# Patient Record
Sex: FEMALE | Race: WHITE
[De-identification: names, ages, dates, MRNs, and addresses within clinical notes are randomized per-mention and may not be internally consistent; named-entity substitution may affect disease eponyms.]

---

## 2018-02-09 ENCOUNTER — HOSPITAL ENCOUNTER (OUTPATIENT)
Dept: HOSPITAL 89 - LAB | Age: 23
End: 2018-02-09
Attending: SURGERY
Payer: COMMERCIAL

## 2018-02-09 VITALS — BODY MASS INDEX: 42.13 KG/M2

## 2018-02-09 DIAGNOSIS — L98.0: Primary | ICD-10-CM

## 2018-02-09 PROCEDURE — 88305 TISSUE EXAM BY PATHOLOGIST: CPT

## 2019-04-12 ENCOUNTER — HOSPITAL ENCOUNTER (EMERGENCY)
Dept: HOSPITAL 89 - ER | Age: 24
Discharge: HOME | End: 2019-04-12
Payer: COMMERCIAL

## 2019-04-12 VITALS — BODY MASS INDEX: 42.13 KG/M2 | WEIGHT: 235 LBS

## 2019-04-12 VITALS — SYSTOLIC BLOOD PRESSURE: 133 MMHG | DIASTOLIC BLOOD PRESSURE: 89 MMHG

## 2019-04-12 DIAGNOSIS — R11.0: ICD-10-CM
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
## 2019-04-12 DIAGNOSIS — S09.90XA: ICD-10-CM

## 2019-04-12 DIAGNOSIS — S00.03XA: Primary | ICD-10-CM
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
## 2019-04-12 PROCEDURE — 99283 EMERGENCY DEPT VISIT LOW MDM: CPT
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
## 2019-07-12 ENCOUNTER — HOSPITAL ENCOUNTER (EMERGENCY)
Dept: HOSPITAL 89 - ER | Age: 24
Discharge: HOME | End: 2019-07-12
Payer: SELF-PAY

## 2019-07-12 VITALS — SYSTOLIC BLOOD PRESSURE: 117 MMHG | DIASTOLIC BLOOD PRESSURE: 74 MMHG

## 2019-07-12 VITALS — WEIGHT: 235 LBS | BODY MASS INDEX: 42.13 KG/M2

## 2019-07-12 DIAGNOSIS — R09.1: ICD-10-CM

## 2019-07-12 DIAGNOSIS — B34.9: Primary | ICD-10-CM

## 2019-07-12 LAB — PLATELET COUNT, AUTOMATED: 325 K/UL (ref 150–450)

## 2019-07-12 PROCEDURE — 84295 ASSAY OF SERUM SODIUM: CPT

## 2019-07-12 PROCEDURE — 96361 HYDRATE IV INFUSION ADD-ON: CPT

## 2019-07-12 PROCEDURE — 85379 FIBRIN DEGRADATION QUANT: CPT

## 2019-07-12 PROCEDURE — 82435 ASSAY OF BLOOD CHLORIDE: CPT

## 2019-07-12 PROCEDURE — 82040 ASSAY OF SERUM ALBUMIN: CPT

## 2019-07-12 PROCEDURE — 84703 CHORIONIC GONADOTROPIN ASSAY: CPT

## 2019-07-12 PROCEDURE — 82247 BILIRUBIN TOTAL: CPT

## 2019-07-12 PROCEDURE — 82310 ASSAY OF CALCIUM: CPT

## 2019-07-12 PROCEDURE — 84484 ASSAY OF TROPONIN QUANT: CPT

## 2019-07-12 PROCEDURE — 84460 ALANINE AMINO (ALT) (SGPT): CPT

## 2019-07-12 PROCEDURE — 82565 ASSAY OF CREATININE: CPT

## 2019-07-12 PROCEDURE — 82374 ASSAY BLOOD CARBON DIOXIDE: CPT

## 2019-07-12 PROCEDURE — 84520 ASSAY OF UREA NITROGEN: CPT

## 2019-07-12 PROCEDURE — 84075 ASSAY ALKALINE PHOSPHATASE: CPT

## 2019-07-12 PROCEDURE — 84132 ASSAY OF SERUM POTASSIUM: CPT

## 2019-07-12 PROCEDURE — 84155 ASSAY OF PROTEIN SERUM: CPT

## 2019-07-12 PROCEDURE — 99284 EMERGENCY DEPT VISIT MOD MDM: CPT

## 2019-07-12 PROCEDURE — 96375 TX/PRO/DX INJ NEW DRUG ADDON: CPT

## 2019-07-12 PROCEDURE — 93005 ELECTROCARDIOGRAM TRACING: CPT

## 2019-07-12 PROCEDURE — 85025 COMPLETE CBC W/AUTO DIFF WBC: CPT

## 2019-07-12 PROCEDURE — 82947 ASSAY GLUCOSE BLOOD QUANT: CPT

## 2019-07-12 PROCEDURE — 96374 THER/PROPH/DIAG INJ IV PUSH: CPT

## 2019-07-12 PROCEDURE — 71045 X-RAY EXAM CHEST 1 VIEW: CPT

## 2019-07-12 PROCEDURE — 83880 ASSAY OF NATRIURETIC PEPTIDE: CPT

## 2019-07-12 PROCEDURE — 84450 TRANSFERASE (AST) (SGOT): CPT

## 2019-07-12 NOTE — RADIOLOGY IMAGING REPORT
FACILITY: [facility] 

 

PATIENT NAME: Long Hui

: 1995

MR: [id number]

V: [id number]

EXAM DATE: 

ORDERING PHYSICIAN: DARWIN RICHARDSON

TECHNOLOGIST: 

 

Location: [facility] [place]

Patient: Long Hui

: 1995

MRN: [id number]

Visit/Account:[id number]

Date of Sevice:  2019

 

ACCESSION #: [id number]

 

EXAMINATION: Portable AP Chest

 

HISTORY: Chest pain.

 

COMPARISON: None.

 

FINDINGS:

The lungs are clear.  No focal consolidation or pleural effusion. No pneumothorax.  Normal cardiomedi
astinal silhouette, with normal heart size and pulmonary vascularity.  Visualized osseous structures 
are unremarkable.

 

IMPRESSION:

No evidence of acute cardiopulmonary disease.

 

Report Dictated By: Blayne Ely MD at 2019 8:34 PM

 

Report E-Signed By: Blayne Ely MD  at 2019 8:35 PM

 

WSN:[id number]

## 2019-07-12 NOTE — EKG
FACILITY: [facility] [place] 

 

PATIENT NAME: MIHIR LUND

: [id number]

MR: [id number]

V: [id number]

EXAM DATE: 

ORDERING PHYSICIAN: DARWIN RICHARDSON

TECHNOLOGIST: BEATRICE

 

Test Reason : CP, SOB

Blood Pressure : ***/*** mmHG

Vent. Rate : 084 BPM     Atrial Rate : 084 BPM

   P-R Int : 164 ms          QRS Dur : 098 ms

    QT Int : 386 ms       P-R-T Axes : 001 103 027 degrees

   QTc Int : 456 ms

 

Normal sinus rhythm

Nonspecific T wave abnormality in V 1-4 consistent with age.

Right axis deviation.

Borderline QTc.

No previous ECGs available

Confirmed by SINGH DENISE ([id number]) on 2019 10:58:19 PM

 

Referred By:             Confirmed By:SINGH DENISE

## 2019-07-12 NOTE — ER REPORT
History and Physical


Time Seen By MD:  19:54


HPI/ROS


CHIEF COMPLAINT: chest pain, diarrhea, vomiting





HISTORY OF PRESENT ILLNESS: This is a 23 year old female. She has been feeling 


sick today. Has had diarrhea and vomiting since earlier today. Unable to keep 


things down. Started having chest pain, bilaterally, lower ribs, worse with 


breathing. Feeling very weak. No cough. Subjective fevers. No trouble urinating.


No sick contacts. They were camping, but do not think any bad food or water 


exposure.


Allergies:  


Coded Allergies:  


     No Known Drug Allergies (Unverified , 4/12/19)


Home Meds


Active Scripts


Promethazine Hcl (PROMETHAZINE HCL) 25 Mg Tablet, 25 MG PO Q8H PRN for 


NAUSEA/VOMITING, #20 TAB 0 Refills


   Prov:DARWIN RICHARDSON MD         7/12/19


Ondansetron 4 Mg Odt (ONDANSETRON 4 MG ODT) 4 Mg Tab.rapdis, 4 MG PO Q6H PRN for


NAUSEA/VOMITING, #20 TAB 0 Refills


   Prov:DARWIN RICHARDSON MD         7/12/19


Hydrocodone Bit/Acetaminophen (HYDROCODON-ACETAMINOPHEN 5-325) 1 Each Tablet, 1 


EACH PO Q4H PRN for PAIN, #12 TAB 0 Refills


   Prov:DARWIN RICHARDSON MD         7/12/19


Reviewed Nurses Notes:  Yes


Hx Smoking:  No


Smoking Status:  Never Smoker


Exposure to Second Hand Smoke?:  No


Constitutional





Vital Sign - Last 24 Hours








 7/12/19 7/12/19 7/12/19 7/12/19





 19:53 19:55 19:59 20:15


 


Temp   98.1 


 


Pulse ???  88 


 


Resp   18 


 


B/P (MAP)  141/66 (91) 141/66 133/111 (118)


 


Pulse Ox   96 


 


O2 Delivery   Room Air 


 


    





 7/12/19 7/12/19 7/12/19 7/12/19





 20:23 20:30 20:45 20:53


 


Pulse 71   ???


 


Resp 15   14


 


B/P (MAP)  128/88 (101) 120/82 (95) 


 


Pulse Ox 99   





 7/12/19 7/12/19 7/12/19 7/12/19





 21:00 21:15 21:23 21:30


 


Pulse   70 


 


Resp   15 


 


B/P (MAP) 113/66 (82) 123/74 (90)  126/88 (101)


 


Pulse Ox   100 





 7/12/19 7/12/19 7/12/19 7/12/19





 21:45 21:50 22:00 22:15


 


Pulse  73  


 


Resp  16  


 


B/P (MAP) 102/77 (85)  111/71 (84) 117/74 (88)


 


Pulse Ox  99  





 7/12/19   





 22:20   


 


Pulse 68   


 


Resp 16   


 


Pulse Ox 100   














Intake and Output   


 


 7/12/19 7/12/19 7/13/19





 15:02 23:02 07:02


 


Intake Total  1000 ml 


 


Balance  1000 ml 








Physical Exam


   General Appearance: The patient is alert. No acute distress. 


Eyes: Pupils are equal, round. No pallor, injection or icterus. 


ENT: Mucous membranes are moist. Normal oral mucosa. Posterior oropharynx is 


normal. 


Neck: Supple and non tender.


Respiratory: Lungs are clear to auscultation. Pain with deep breaths.


Cardiovascular: Regular rate and rhythm. No murmurs, gallops or rubs. Normal 


capillary refill. No edema. 


Gastrointestinal: Abdomen with discomfort throughout, worse upper abdomen. 


Nondistended. No rebound or guarding. Normal active bowel sounds. No 


costovertebral angle tenderness with percussion. 


Neurological: Alert and oriented x3. No focal neurologic deficits


Skin: Warm and dry.





DIFFERENTIAL DIAGNOSIS: After history and physical exam, differential diagnosis 


was considered for a patient with multiple symptoms of gastrointestinal vomiting


and diarrhea, with some bilateral pleuritic type chest pain. Vital signs are 


stable.





Medical Decision Making


Data Points


Result Diagram:  


7/12/19 1959 7/12/19 1959





Laboratory





Hematology








Test


 7/12/19


19:59


 


White Blood Count


 9.5 k/uL


(4.5-11.0)


 


Red Blood Count


 6.00 M/uL


(4.17-5.56)  H


 


Hemoglobin


 17.0 g/dL


(12.0-16.0)  H


 


Hematocrit


 49.2 %


(34.0-47.0)  H


 


Mean Corpuscular Volume


 82.0 fL


(80.0-96.0)


 


Mean Corpuscular Hemoglobin


 28.4 pg


(26.0-33.0)


 


Mean Corpuscular Hemoglobin


Concent 34.6 g/dL


(32.0-36.0)


 


Red Cell Distribution Width


 13.3 %


(11.5-14.5)


 


Platelet Count


 325 K/uL


(150-450)


 


Mean Platelet Volume


 8.4 fL


(7.2-11.1)


 


Neutrophils (%) (Auto)


 51.7 %


(39.4-72.5)


 


Lymphocytes (%) (Auto)


 37.6 %


(17.6-49.6)


 


Monocytes (%) (Auto)


 6.7 %


(4.1-12.4)


 


Eosinophils (%) (Auto)


 3.6 %


(0.4-6.7)


 


Basophils (%) (Auto)


 0.4 %


(0.3-1.4)


 


Nucleated RBC Relative Count


(auto) 0.2 /100WBC  





 


Neutrophils # (Auto)


 4.9 K/uL


(2.0-7.4)


 


Lymphocytes # (Auto)


 3.6 K/uL


(1.3-3.6)


 


Monocytes # (Auto)


 0.6 K/uL


(0.3-1.0)


 


Eosinophils # (Auto)


 0.3 K/uL


(0.0-0.5)


 


Basophils # (Auto)


 0.0 K/uL


(0.0-0.1)


 


Nucleated RBC Absolute Count


(auto) 0.02 K/uL  











Chemistry








Test


 7/12/19


19:59


 


Sodium Level


 143 mmol/L


(137-145)


 


Potassium Level


 3.5 mmol/L


(3.5-5.0)


 


Chloride Level


 103 mmol/L


()


 


Carbon Dioxide Level


 22 mmol/L


(22-31)


 


Blood Urea Nitrogen


 12 mg/dl


(7-18)


 


Creatinine


 0.90 mg/dl


(0.52-1.04)


 


Glomerular Filtration Rate


Calc > 60.0 





 


Random Glucose


 107 mg/dl


()


 


Calcium Level


 9.6 mg/dl


(8.4-10.2)


 


Total Bilirubin


 1.1 mg/dl


(0.2-1.3)


 


Aspartate Amino Transf


(AST/SGOT) 64 U/L (0-35) 





 


Alanine Aminotransferase


(ALT/SGPT) 54 U/L (0-56) 





 


Alkaline Phosphatase 69 U/L (0-126) 


 


Troponin I < 0.012 ng/ml 


 


B-Type Natriuretic Peptide


 < 5 pg/ml


(0-100)


 


Total Protein


 9.3 g/dl


(6.3-8.2)


 


Albumin


 4.9 g/dl


(3.5-5.0)


 


Human Chorionic Gonadotropin,


Qual Negative


(NEGATIVE)








Coagulation








Test


 7/12/19


19:59


 


D-Dimer Quantitative (PE/DVT)


 0.35 ug/ml


(0-0.50)











EKG/Imaging


EKG Interpretation


12 lead EKG:


      Rhythm: Normal sinus rhythm, rate 84


      Axis: normal 


      QRS: normal


      ST segments: Nonspecific T-wave changes, no ST elevation or depression


Imaging


EXAMINATION: Portable AP Chest


 


HISTORY: Chest pain.


 


COMPARISON: None.


 


FINDINGS:


The lungs are clear.  No focal consolidation or pleural effusion. No 


pneumothorax.  Normal cardiomediastinal silhouette, with normal heart size and 


pulmonary vascularity.  Visualized osseous structures are unremarkable.


 


IMPRESSION:


No evidence of acute cardiopulmonary disease.


 


Report Dictated By: Blayne Ely MD at 7/12/2019 8:34 PM





ED Course/Re-evaluation


Clinical Indication for ER IV:  Hydration, IV Access


ED Course


Patient given IV fluids and zofran. She took aspirin prior to arrival. EKG 


nonspecific but no acute ischemic changes. Appears stable, and likely viral 


syndrome. Later with further nausea. Given Phenergan with good improvement. See 


instructions. Negative troponin.


Decision to Disposition Date:  Jul 12, 2019


Decision to Disposition Time:  22:31





Depart


Departure


Latest Vital Signs





Vital Signs








  Date Time  Temp Pulse Resp B/P (MAP) Pulse Ox O2 Delivery O2 Flow Rate FiO2


 


7/12/19 22:20  68 16  100   


 


7/12/19 22:15    117/74 (88)    


 


7/12/19 19:59 98.1     Room Air  








Impression:  


   Primary Impression:  


   Viral syndrome


   Additional Impression:  


   Pleuritic chest pain


Condition:  Improved


Disposition:  HOME OR SELF-CARE


New Scripts


Promethazine Hcl (PROMETHAZINE HCL) 25 Mg Tablet


25 MG PO Q8H PRN for NAUSEA/VOMITING, #20 TAB 0 Refills


   Prov: DARWIN RICHARDSON MD         7/12/19 


Ondansetron 4 Mg Odt (ONDANSETRON 4 MG ODT) 4 Mg Tab.rapdis


4 MG PO Q6H PRN for NAUSEA/VOMITING, #20 TAB 0 Refills


   Prov: DARWIN RICHARDSON MD         7/12/19 


Hydrocodone Bit/Acetaminophen (HYDROCODON-ACETAMINOPHEN 5-325) 1 Each Tablet


1 EACH PO Q4H PRN for PAIN, #12 TAB 0 Refills


   Prov: DARWIN RICHARDSON MD         7/12/19


Patient Instructions:  Viral Syndrome (ED)





Additional Instructions:  


Your symptoms are likely caused by a viral infection, but could be from exposure


to bad food or food poisoning.


We did not find any problems with your heart, any problems with lungs, or any 


sign of blood clots.


We recommend rest and hydration for the next few days.


Lortab 5/325, one every 4 hours as needed for pain.


Ibuprofen 200mg over the counter tablets, 4 tablets every 8 hours as needed fo 


rpain.


Zofran 4mg, one every 6 hours as needed for nausea or vomiting.


You can also use Phenergan 25mg, one every 6 hours as needed for nausea or 


vomiting.





Problem Qualifiers











DARWIN RICHARDSON MD             Jul 12, 2019 19:56